# Patient Record
Sex: FEMALE | Race: WHITE | NOT HISPANIC OR LATINO | Employment: UNEMPLOYED | URBAN - METROPOLITAN AREA
[De-identification: names, ages, dates, MRNs, and addresses within clinical notes are randomized per-mention and may not be internally consistent; named-entity substitution may affect disease eponyms.]

---

## 2017-09-16 ENCOUNTER — HOSPITAL ENCOUNTER (OUTPATIENT)
Dept: SLEEP MEDICINE | Facility: HOSPITAL | Age: 47
Discharge: HOME | End: 2017-09-16
Attending: NURSE PRACTITIONER
Payer: MEDICAID

## 2017-09-16 DIAGNOSIS — G47.33 OBSTRUCTIVE SLEEP APNEA (ADULT) (PEDIATRIC): ICD-10-CM

## 2017-09-16 PROCEDURE — 95810 POLYSOM 6/> YRS 4/> PARAM: CPT

## 2017-10-14 ENCOUNTER — HOSPITAL ENCOUNTER (OUTPATIENT)
Dept: SLEEP MEDICINE | Facility: HOSPITAL | Age: 47
Discharge: HOME | End: 2017-10-14
Attending: NURSE PRACTITIONER
Payer: MEDICAID

## 2017-10-14 DIAGNOSIS — G47.33 OBSTRUCTIVE SLEEP APNEA SYNDROME: ICD-10-CM

## 2017-10-14 PROCEDURE — 95811 POLYSOM 6/>YRS CPAP 4/> PARM: CPT

## 2018-11-13 ENCOUNTER — CLINICAL SUPPORT (OUTPATIENT)
Dept: SMOKING CESSATION | Facility: CLINIC | Age: 48
End: 2018-11-13
Payer: COMMERCIAL

## 2018-11-13 DIAGNOSIS — F17.210 HEAVY CIGARETTE SMOKER (20-39 PER DAY): Primary | ICD-10-CM

## 2018-11-13 PROCEDURE — 99404 PREV MED CNSL INDIV APPRX 60: CPT | Mod: S$GLB,,,

## 2018-11-13 RX ORDER — BUPROPION HYDROCHLORIDE 150 MG/1
TABLET, EXTENDED RELEASE ORAL
Qty: 60 TABLET | Refills: 0 | Status: SHIPPED | OUTPATIENT
Start: 2018-11-13 | End: 2018-12-11 | Stop reason: SDUPTHER

## 2018-11-13 RX ORDER — VARENICLINE TARTRATE 0.5 (11)-1
KIT ORAL
Qty: 1 PACKAGE | Refills: 0 | Status: SHIPPED | OUTPATIENT
Start: 2018-11-13 | End: 2018-12-13

## 2018-11-13 NOTE — PROGRESS NOTES
Patient currently smoking 2 packs per day and will begin 1:1 cessation therapy with [facility]. Patient will begin prescribed tobacco cessation medication regimen of starter pack of Chantix, along with 150mg bupropion in AM for 3 days, then increase to 150mg bupropion BID.

## 2018-11-27 ENCOUNTER — CLINICAL SUPPORT (OUTPATIENT)
Dept: SMOKING CESSATION | Facility: CLINIC | Age: 48
End: 2018-11-27
Payer: COMMERCIAL

## 2018-11-27 DIAGNOSIS — F17.210 MODERATE SMOKER (20 OR LESS PER DAY): Primary | ICD-10-CM

## 2018-11-27 PROCEDURE — 99402 PREV MED CNSL INDIV APPRX 30: CPT | Mod: S$GLB,,,

## 2018-11-27 NOTE — Clinical Note
20 cig/day, down from 2-2.5 packs/day two weeks ago; pt is commended on progress, encouraged to stay focused and patient with continued forward progress; discussed with patient her caffeine intake and dealing with frustration; The patient remains on the prescribed tobacco cessation medication regimen of 1 mg Chantix BID, along with 150mg bupropion BID, monitoring for increased dry mouth

## 2018-11-28 NOTE — PROGRESS NOTES
Individual Follow-Up Form    11/27/2018    Quit Date:     Clinical Status of Patient: Outpatient    Length of Service: 30 minutes    Continuing Medication: yes  Chantix or Wellbutrin    Other Medications:      Target Symptoms: Withdrawal and medication side effects. The following were  rated moderate (3) to severe (4) on TCRS:  · Moderate (3): none  · Severe (4): none    Comments: 20 cig/day, down from 2-2.5 packs/day two weeks ago; pt is commended on progress, encouraged to stay focused and patient with continued forward progress; discussed with patient her caffeine intake and dealing with frustration; The patient remains on the prescribed tobacco cessation medication regimen of 1 mg Chantix BID, along with 150mg bupropion BID, monitoring for increased dry mouth       Diagnosis: F17.210    Next Visit: 2 weeks

## 2018-12-09 DIAGNOSIS — F17.210 HEAVY CIGARETTE SMOKER (20-39 PER DAY): ICD-10-CM

## 2018-12-10 RX ORDER — BUPROPION HYDROCHLORIDE 150 MG/1
TABLET, EXTENDED RELEASE ORAL
Qty: 60 TABLET | Refills: 0 | OUTPATIENT
Start: 2018-12-10

## 2018-12-10 RX ORDER — VARENICLINE TARTRATE 0.5 (11)-1
KIT ORAL
Refills: 0 | OUTPATIENT
Start: 2018-12-10

## 2018-12-11 ENCOUNTER — CLINICAL SUPPORT (OUTPATIENT)
Dept: SMOKING CESSATION | Facility: CLINIC | Age: 48
End: 2018-12-11
Payer: COMMERCIAL

## 2018-12-11 DIAGNOSIS — F17.210 MODERATE SMOKER (20 OR LESS PER DAY): Primary | ICD-10-CM

## 2018-12-11 DIAGNOSIS — F17.210 HEAVY CIGARETTE SMOKER (20-39 PER DAY): ICD-10-CM

## 2018-12-11 PROCEDURE — 99403 PREV MED CNSL INDIV APPRX 45: CPT | Mod: S$GLB,,,

## 2018-12-11 RX ORDER — BUPROPION HYDROCHLORIDE 150 MG/1
150 TABLET, EXTENDED RELEASE ORAL 2 TIMES DAILY
Qty: 60 TABLET | Refills: 0 | Status: SHIPPED | OUTPATIENT
Start: 2018-12-11 | End: 2019-01-07 | Stop reason: SDUPTHER

## 2018-12-11 RX ORDER — VARENICLINE TARTRATE 1 MG/1
1 TABLET, FILM COATED ORAL 2 TIMES DAILY
Qty: 62 TABLET | Refills: 0 | Status: SHIPPED | OUTPATIENT
Start: 2018-12-11 | End: 2019-01-07 | Stop reason: SDUPTHER

## 2018-12-11 NOTE — Clinical Note
pt is down to 10-20 cig/day from 2-2.5 packs/day; pt is doing well on combination therapy of Chantix and bupropion, except for constant ringing in ears that increased as dosage increased, pt agreed to reduce to .5mg Chantix and 75mg bupropion BID and re-evaluate symptoms in clinic next week; congratulated pt on progress and discussed with pt habit breaking, caffeine intake, exercise, and patience with self during quitting process

## 2018-12-12 NOTE — PROGRESS NOTES
Individual Follow-Up Form    12/11/2018    Quit Date:     Clinical Status of Patient: Outpatient    Length of Service: 30 minutes    Continuing Medication: yes  Chantix or Wellbutrin    Other Medications:      Target Symptoms: Withdrawal and medication side effects. The following were  rated moderate (3) to severe (4) on TCRS:  · Moderate (3): none  · Severe (4): ringing in ears;reviewed with patient    Comments: pt is down to 10-20 cig/day from 2-2.5 packs/day; pt is doing well on combination therapy of Chantix and bupropion, except for constant ringing in ears that increased as dosage increased, pt agreed to reduce to .5mg Chantix and 75mg bupropion BID and re-evaluate symptoms in clinic next week; congratulated pt on progress and discussed with pt habit breaking, caffeine intake, exercise, and patience with self during quitting process    Diagnosis: F17.210    Next Visit: 1 week

## 2018-12-20 ENCOUNTER — CLINICAL SUPPORT (OUTPATIENT)
Dept: SMOKING CESSATION | Facility: CLINIC | Age: 48
End: 2018-12-20
Payer: COMMERCIAL

## 2018-12-20 DIAGNOSIS — F17.210 MODERATE SMOKER (20 OR LESS PER DAY): Primary | ICD-10-CM

## 2018-12-20 PROCEDURE — 99402 PREV MED CNSL INDIV APPRX 30: CPT | Mod: S$GLB,,,

## 2018-12-20 NOTE — Clinical Note
18-20 cig/day; pt is responding well to combination therapy of 1mg Chantix BID, along with 150mg bupropion BID, without any negative side effects at this time, pt increased back to full dose on her own and is no longer experiencing the ringing in the ears as before; pt is working on reduction plan, reducing 1-2 cig/day every 5-7 days

## 2018-12-21 NOTE — PROGRESS NOTES
Individual Follow-Up Form    12/20/2018    Quit Date:     Clinical Status of Patient: Outpatient    Length of Service: 30 minutes    Continuing Medication: yes  Chantix or Wellbutrin    Other Medications:      Target Symptoms: Withdrawal and medication side effects. The following were  rated moderate (3) to severe (4) on TCRS:  · Moderate (3): none  · Severe (4): none    Comments: 18-20 cig/day; pt is responding well to combination therapy of 1mg Chantix BID, along with 150mg bupropion BID, without any negative side effects at this time, pt increased back to full dose on her own and is no longer experiencing the ringing in the ears as before; pt is working on reduction plan, reducing 1-2 cig/day every 5-7 days    Diagnosis: F17.210    Next Visit: 2 weeks

## 2019-01-07 ENCOUNTER — CLINICAL SUPPORT (OUTPATIENT)
Dept: SMOKING CESSATION | Facility: CLINIC | Age: 49
End: 2019-01-07
Payer: COMMERCIAL

## 2019-01-07 DIAGNOSIS — F17.210 MODERATE SMOKER (20 OR LESS PER DAY): Primary | ICD-10-CM

## 2019-01-07 DIAGNOSIS — F17.210 MODERATE SMOKER (20 OR LESS PER DAY): ICD-10-CM

## 2019-01-07 DIAGNOSIS — F17.210 HEAVY CIGARETTE SMOKER (20-39 PER DAY): ICD-10-CM

## 2019-01-07 PROCEDURE — 99402 PREV MED CNSL INDIV APPRX 30: CPT | Mod: S$GLB,,,

## 2019-01-07 PROCEDURE — 99402 PR PREVENT COUNSEL,INDIV,30 MIN: ICD-10-PCS | Mod: S$GLB,,,

## 2019-01-07 RX ORDER — VARENICLINE TARTRATE 1 MG/1
TABLET, FILM COATED ORAL
Qty: 62 TABLET | Refills: 0 | Status: CANCELLED | OUTPATIENT
Start: 2019-01-07

## 2019-01-07 RX ORDER — VARENICLINE TARTRATE 1 MG/1
1 TABLET, FILM COATED ORAL 2 TIMES DAILY
Qty: 62 TABLET | Refills: 0 | Status: SHIPPED | OUTPATIENT
Start: 2019-01-07 | End: 2019-01-29 | Stop reason: SDUPTHER

## 2019-01-07 RX ORDER — BUPROPION HYDROCHLORIDE 150 MG/1
150 TABLET, EXTENDED RELEASE ORAL 2 TIMES DAILY
Qty: 60 TABLET | Refills: 0 | Status: SHIPPED | OUTPATIENT
Start: 2019-01-07 | End: 2019-01-29 | Stop reason: SDUPTHER

## 2019-01-08 NOTE — PROGRESS NOTES
Individual Follow-Up Form    1/7/2019    Quit Date:     Clinical Status of Patient: Outpatient    Length of Service: 30 minutes    Continuing Medication: yes  Chantix or Wellbutrin    Other Medications:      Target Symptoms: Withdrawal and medication side effects. The following were  rated moderate (3) to severe (4) on TCRS:  · Moderate (3): none  · Severe (4): none    Comments: 16 cig/day; The patient remains on the prescribed tobacco cessation medication regimen of 1 mg Chantix BID, along with 150mg bupropion BID as directed, without any negative side effects at this time; pt reports still not liking the way food tastes and smells, also reports does not want cigarettes as much and does not smoke entire cigarettes; pt will begin reduction plan of decreasing 1-2 cig/day every 5-6 days       Diagnosis: F17.210    Next Visit: 3 weeks

## 2019-01-29 ENCOUNTER — CLINICAL SUPPORT (OUTPATIENT)
Dept: SMOKING CESSATION | Facility: CLINIC | Age: 49
End: 2019-01-29
Payer: COMMERCIAL

## 2019-01-29 DIAGNOSIS — F17.210 HEAVY CIGARETTE SMOKER (20-39 PER DAY): ICD-10-CM

## 2019-01-29 DIAGNOSIS — F17.210 LIGHT CIGARETTE SMOKER (1-9 CIGS/DAY): Primary | ICD-10-CM

## 2019-01-29 DIAGNOSIS — F17.210 MODERATE SMOKER (20 OR LESS PER DAY): ICD-10-CM

## 2019-01-29 PROCEDURE — 99402 PR PREVENT COUNSEL,INDIV,30 MIN: ICD-10-PCS | Mod: S$GLB,,,

## 2019-01-29 PROCEDURE — 99402 PREV MED CNSL INDIV APPRX 30: CPT | Mod: S$GLB,,,

## 2019-01-29 RX ORDER — VARENICLINE TARTRATE 1 MG/1
1 TABLET, FILM COATED ORAL 2 TIMES DAILY
Qty: 62 TABLET | Refills: 0 | Status: SHIPPED | OUTPATIENT
Start: 2019-01-29 | End: 2019-03-01

## 2019-01-29 RX ORDER — BUPROPION HYDROCHLORIDE 150 MG/1
150 TABLET, EXTENDED RELEASE ORAL 2 TIMES DAILY
Qty: 60 TABLET | Refills: 0 | Status: SHIPPED | OUTPATIENT
Start: 2019-01-29 | End: 2019-03-13 | Stop reason: SDUPTHER

## 2019-01-29 NOTE — Clinical Note
7 cig/day; pt is doing well with combination therapy and remains on the prescribed tobacco cessation medication regimen of 1 mg Chantix BID, along with 150mg bupropion BID as directed, without any negative side effects at this time; worked with pt to develop reduction plan by reducing 1 cig/day each Monday, resulting in a quit date of 3/18/19 if followed as planned, pt has option to reduce more frequently to quit earlier but advised not to reduce by more than 2 cig/day each week

## 2019-01-30 NOTE — PROGRESS NOTES
Individual Follow-Up Form    1/29/2019    Quit Date:     Clinical Status of Patient: Outpatient    Length of Service: 30 minutes    Continuing Medication: yes  Chantix or Wellbutrin    Other Medications:      Target Symptoms: Withdrawal and medication side effects. The following were  rated moderate (3) to severe (4) on TCRS:  · Moderate (3): none  · Severe (4): none    Comments: 7 cig/day; pt is doing well with combination therapy and remains on the prescribed tobacco cessation medication regimen of 1 mg Chantix BID, along with 150mg bupropion BID as directed, without any negative side effects at this time; worked with pt to develop reduction plan by reducing 1 cig/day each Monday, resulting in a quit date of 3/18/19 if followed as planned, pt has option to reduce more frequently to quit earlier but advised not to reduce by more than 2 cig/day each week       Diagnosis: F17.210    Next Visit: 2 weeks

## 2019-02-13 ENCOUNTER — CLINICAL SUPPORT (OUTPATIENT)
Dept: SMOKING CESSATION | Facility: CLINIC | Age: 49
End: 2019-02-13
Payer: COMMERCIAL

## 2019-02-13 DIAGNOSIS — F17.210 LIGHT CIGARETTE SMOKER (1-9 CIGS/DAY): Primary | ICD-10-CM

## 2019-02-13 PROCEDURE — 99406 BEHAV CHNG SMOKING 3-10 MIN: CPT | Mod: S$GLB,,,

## 2019-02-13 PROCEDURE — 99406 PR TOBACCO USE CESSATION INTERMEDIATE 3-10 MINUTES: ICD-10-PCS | Mod: S$GLB,,,

## 2019-02-14 NOTE — PROGRESS NOTES
5 cig/day; pt is doing well with reduction plan and remains focused on following plan as discussed; pt is doing well with combination therapy and remains on the prescribed tobacco cessation medication regimen of 1 mg Chantix BID, along with 150mg bupropion BID as directed, without any negative side effects at this time; pt has been working on finding things that work well her changes in taste and smell; pt in on track to achieve quit date o f 3/18/19

## 2019-03-13 ENCOUNTER — CLINICAL SUPPORT (OUTPATIENT)
Dept: SMOKING CESSATION | Facility: CLINIC | Age: 49
End: 2019-03-13
Payer: COMMERCIAL

## 2019-03-13 DIAGNOSIS — F17.210 LIGHT CIGARETTE SMOKER (1-9 CIGS/DAY): Primary | ICD-10-CM

## 2019-03-13 DIAGNOSIS — F17.210 HEAVY CIGARETTE SMOKER (20-39 PER DAY): ICD-10-CM

## 2019-03-13 PROCEDURE — 99402 PREV MED CNSL INDIV APPRX 30: CPT | Mod: S$GLB,,,

## 2019-03-13 PROCEDURE — 99402 PR PREVENT COUNSEL,INDIV,30 MIN: ICD-10-PCS | Mod: S$GLB,,,

## 2019-03-13 RX ORDER — VARENICLINE TARTRATE 1 MG/1
1 TABLET, FILM COATED ORAL 2 TIMES DAILY
Qty: 62 TABLET | Refills: 0 | Status: SHIPPED | OUTPATIENT
Start: 2019-03-13 | End: 2019-04-13

## 2019-03-13 RX ORDER — BUPROPION HYDROCHLORIDE 150 MG/1
150 TABLET, EXTENDED RELEASE ORAL 2 TIMES DAILY
Qty: 60 TABLET | Refills: 0 | Status: SHIPPED | OUTPATIENT
Start: 2019-03-13 | End: 2019-04-15 | Stop reason: SDUPTHER

## 2019-03-13 NOTE — Clinical Note
7 cig/day; pt fell ill and was unable to continue Chantix and bupropion for approx 2 weeks, pt slipped back to 7 cig/day but remains focused on quit attempt; pt has resumed 1mg Chantix BID and 150mg bupropion BID without any negative side effects and is responding well; pt has adjusted quit date by two to reflect new plan, new date is 4/1/19; commended pt on her progress and ability to remain focused during her illness

## 2019-03-13 NOTE — PROGRESS NOTES
Individual Follow-Up Form    3/13/2019    Quit Date:     Clinical Status of Patient: Outpatient    Length of Service: 30 minutes    Continuing Medication: yes  Chantix or Wellbutrin    Other Medications:      Target Symptoms: Withdrawal and medication side effects. The following were  rated moderate (3) to severe (4) on TCRS:  · Moderate (3): desire/crave tobacco; reviewed with patient  · Severe (4): none    Comments: 7 cig/day; pt fell ill and was unable to continue Chantix and bupropion for approx 2 weeks, pt slipped back to 7 cig/day but remains focused on quit attempt; pt has resumed 1mg Chantix BID and 150mg bupropion BID without any negative side effects and is responding well; pt has adjusted quit date by two to reflect new plan, new date is 4/1/19; commended pt on her progress and ability to remain focused during her illness    Diagnosis: F17.210    Next Visit: 2 weeks

## 2019-04-08 ENCOUNTER — TELEPHONE (OUTPATIENT)
Dept: SMOKING CESSATION | Facility: CLINIC | Age: 49
End: 2019-04-08

## 2019-04-13 DIAGNOSIS — F17.210 HEAVY CIGARETTE SMOKER (20-39 PER DAY): ICD-10-CM

## 2019-04-13 RX ORDER — BUPROPION HYDROCHLORIDE 150 MG/1
TABLET, EXTENDED RELEASE ORAL
Qty: 60 TABLET | Refills: 0 | OUTPATIENT
Start: 2019-04-13

## 2019-04-15 ENCOUNTER — CLINICAL SUPPORT (OUTPATIENT)
Dept: SMOKING CESSATION | Facility: CLINIC | Age: 49
End: 2019-04-15
Payer: COMMERCIAL

## 2019-04-15 DIAGNOSIS — F17.210 LIGHT CIGARETTE SMOKER (1-9 CIGS/DAY): Primary | ICD-10-CM

## 2019-04-15 DIAGNOSIS — F17.210 HEAVY CIGARETTE SMOKER (20-39 PER DAY): ICD-10-CM

## 2019-04-15 PROCEDURE — 99402 PREV MED CNSL INDIV APPRX 30: CPT | Mod: S$GLB,,,

## 2019-04-15 PROCEDURE — 99402 PR PREVENT COUNSEL,INDIV,30 MIN: ICD-10-PCS | Mod: S$GLB,,,

## 2019-04-15 RX ORDER — BUPROPION HYDROCHLORIDE 150 MG/1
150 TABLET, EXTENDED RELEASE ORAL 2 TIMES DAILY
Qty: 60 TABLET | Refills: 0 | Status: SHIPPED | OUTPATIENT
Start: 2019-04-15 | End: 2019-04-29 | Stop reason: ALTCHOICE

## 2019-04-15 RX ORDER — VARENICLINE TARTRATE 1 MG/1
1 TABLET, FILM COATED ORAL 2 TIMES DAILY
Qty: 60 TABLET | Refills: 0 | Status: SHIPPED | OUTPATIENT
Start: 2019-04-15 | End: 2019-04-29 | Stop reason: SDUPTHER

## 2019-04-15 RX ORDER — DM/P-EPHED/ACETAMINOPH/DOXYLAM 30-7.5/3
LIQUID (ML) ORAL
Qty: 108 LOZENGE | Refills: 0 | Status: SHIPPED | OUTPATIENT
Start: 2019-04-15

## 2019-04-15 NOTE — PROGRESS NOTES
Individual Follow-Up Form    4/15/2019    Quit Date:     Clinical Status of Patient: Outpatient    Length of Service: 30 minutes    Continuing Medication: yes  Chantix, Wellbutrin or Nicotine Lozenges    Other Medications:      Target Symptoms: Withdrawal and medication side effects. The following were  rated moderate (3) to severe (4) on TCRS:  · Moderate (3): none  · Severe (4): none    Comments: 5 cig/day; pt is doing well on combination therapy and remains on the prescribed tobacco cessation medication regimen of 1 mg Chantix BID,along with 150mg bupropion BID, adding 2mg nicotine lozenge to replace remaining cigarettes, without any negative side effects at this time; discussed with pt habit breaking, changes in taste/smell, and resuming forward progress in quit attempt; pt set goals to get rid of coffee pot and to focus on replacing Dr. Pepper with another beverage to break association with these items and smoking      Diagnosis: F17.210    Next Visit: 2 weeks

## 2019-04-15 NOTE — Clinical Note
5 cig/day; pt is doing well on combination therapy and remains on the prescribed tobacco cessation medication regimen of 1 mg Chantix BID,along with 150mg bupropion BID, adding 2mg nicotine lozenge to replace remaining cigarettes, without any negative side effects at this time; discussed with pt habit breaking, changes in taste/smell, and resuming forward progress in quit attempt; pt set goals to get rid of coffee pot and to focus on replacing Dr. Pepper with another beverage to break association with these items and smoking

## 2019-04-29 ENCOUNTER — CLINICAL SUPPORT (OUTPATIENT)
Dept: SMOKING CESSATION | Facility: CLINIC | Age: 49
End: 2019-04-29
Payer: COMMERCIAL

## 2019-04-29 DIAGNOSIS — F17.210 LIGHT CIGARETTE SMOKER (1-9 CIGS/DAY): Primary | ICD-10-CM

## 2019-04-29 PROCEDURE — 99402 PREV MED CNSL INDIV APPRX 30: CPT | Mod: S$GLB,,,

## 2019-04-29 PROCEDURE — 99402 PR PREVENT COUNSEL,INDIV,30 MIN: ICD-10-PCS | Mod: S$GLB,,,

## 2019-04-29 RX ORDER — ESCITALOPRAM OXALATE 20 MG/1
20 TABLET ORAL DAILY
COMMUNITY

## 2019-04-29 RX ORDER — VARENICLINE TARTRATE 1 MG/1
1 TABLET, FILM COATED ORAL 2 TIMES DAILY
Qty: 60 TABLET | Refills: 0 | Status: SHIPPED | OUTPATIENT
Start: 2019-04-29 | End: 2019-05-29

## 2019-04-29 NOTE — Clinical Note
4 cig/day; pt was taken off bupropion by P to replace with another depression medication, pt remains on 1mg Chantix BID, along with 2mg nicotine lozenges as needed; discussed with patient habit breaking behaviors and pt is encouraged to increase lozenge use and to move comfortable glider rocker to front porch to inhibit habitual use; pt reports only taking a couple puffs at a time and realized she does not want the cigarette

## 2019-04-29 NOTE — PROGRESS NOTES
Individual Follow-Up Form    4/29/2019    Quit Date:     Clinical Status of Patient: Outpatient    Length of Service: 30 minutes    Continuing Medication: yes  Chantix or Nicotine Lozenges    Other Medications:      Target Symptoms: Withdrawal and medication side effects. The following were  rated moderate (3) to severe (4) on TCRS:  · Moderate (3): none  · Severe (4): none    Comments: 4 cig/day; pt was taken off bupropion by [facility] to replace with another depression medication, pt remains on 1mg Chantix BID, along with 2mg nicotine lozenges as needed; discussed with patient habit breaking behaviors and pt is encouraged to increase lozenge use and to move comfortable glider rocker to front porch to inhibit habitual use; pt reports only taking a couple puffs at a time and realized she does not want the cigarette    Diagnosis: F17.210    Next Visit: 2 weeks

## 2019-05-13 ENCOUNTER — CLINICAL SUPPORT (OUTPATIENT)
Dept: SMOKING CESSATION | Facility: CLINIC | Age: 49
End: 2019-05-13
Payer: COMMERCIAL

## 2019-05-13 DIAGNOSIS — F17.210 LIGHT CIGARETTE SMOKER (1-9 CIGS/DAY): Primary | ICD-10-CM

## 2019-05-13 PROCEDURE — 99407 BEHAV CHNG SMOKING > 10 MIN: CPT | Mod: S$GLB,,,

## 2019-05-13 PROCEDURE — 99407 PR TOBACCO USE CESSATION INTENSIVE >10 MINUTES: ICD-10-PCS | Mod: S$GLB,,,

## 2019-05-13 NOTE — PROGRESS NOTES
Individual Follow-Up Form    5/13/2019    Quit Date:     Clinical Status of Patient: Outpatient    Length of Service: 30 minutes    Continuing Medication: yes  Chantix or Nicotine Lozenges    Other Medications:      Target Symptoms: Withdrawal and medication side effects. The following were  rated moderate (3) to severe (4) on TCRS:  · Moderate (3): none  · Severe (4): none    Comments: 4 cig/day; pt remains on 1mg Chantix BID, along with 2mg nicotine lozenges as needed; discussed with patient who is in control (pt vs cig), rewarding progress, and set goal to complete dry run    Diagnosis: F17.200    Next Visit: 2 weeks

## 2019-05-13 NOTE — Clinical Note
4 cig/day; pt remains on 1mg Chantix BID, along with 2mg nicotine lozenges as needed; discussed with patient who is in control (pt vs cig), rewarding progress, and set goal to complete dry run

## 2019-05-30 ENCOUNTER — TELEPHONE (OUTPATIENT)
Dept: SMOKING CESSATION | Facility: CLINIC | Age: 49
End: 2019-05-30

## 2019-06-18 ENCOUNTER — TELEPHONE (OUTPATIENT)
Dept: SMOKING CESSATION | Facility: CLINIC | Age: 49
End: 2019-06-18

## 2019-06-18 DIAGNOSIS — F17.200 NICOTINE DEPENDENCE: Primary | ICD-10-CM

## 2019-06-18 RX ORDER — VARENICLINE TARTRATE 1 MG/1
1 TABLET, FILM COATED ORAL 2 TIMES DAILY
Qty: 60 TABLET | Refills: 0 | Status: SHIPPED | OUTPATIENT
Start: 2019-06-18 | End: 2019-07-18

## 2019-07-10 ENCOUNTER — CLINICAL SUPPORT (OUTPATIENT)
Dept: SMOKING CESSATION | Facility: CLINIC | Age: 49
End: 2019-07-10
Payer: COMMERCIAL

## 2019-07-10 DIAGNOSIS — F17.200 NICOTINE DEPENDENCE: Primary | ICD-10-CM

## 2019-07-10 PROCEDURE — 99407 BEHAV CHNG SMOKING > 10 MIN: CPT | Mod: S$GLB,,,

## 2019-07-10 PROCEDURE — 99407 PR TOBACCO USE CESSATION INTENSIVE >10 MINUTES: ICD-10-PCS | Mod: S$GLB,,,

## 2019-07-10 NOTE — Clinical Note
0 cig/day since 6/16/19; pt has discontinued cessation medication on her own on 6/22 and is not interested in resuming at this time; SCT end date 7/11/19, next avail 11/12/19; pt is discharged from cessation clinic, encouraged to contact clinic if cravings/desire increase

## 2019-07-10 NOTE — PROGRESS NOTES
Individual Follow-Up Form    7/10/2019    Quit Date: 6/16/19    Clinical Status of Patient: Outpatient    Length of Service: 30 minutes    Continuing Medication: no    Other Medications:      Target Symptoms: Withdrawal and medication side effects. The following were  rated moderate (3) to severe (4) on TCRS:  · Moderate (3): none  · Severe (4): none    Comments: 0 cig/day since 6/16/19; pt has discontinued cessation medication on her own on 6/22 and is not interested in resuming at this time; SCT end date 7/11/19, next avail 11/12/19; pt is discharged from cessation clinic, encouraged to contact clinic if cravings/desire increase    Diagnosis: F17.200    Next Visit: discharged from clinic

## 2019-12-02 ENCOUNTER — TELEPHONE (OUTPATIENT)
Dept: SMOKING CESSATION | Facility: CLINIC | Age: 49
End: 2019-12-02

## 2020-08-07 ENCOUNTER — TELEPHONE (OUTPATIENT)
Dept: PHARMACY | Facility: CLINIC | Age: 50
End: 2020-08-07

## 2020-08-07 NOTE — TELEPHONE ENCOUNTER
Krysten Saleh M Staff 1 hour ago (3:37 PM)     Good Afternoon,     I'm working on a PA for the patient's [employer]. Insurance wants her to try [employer] or [employer]. Can it be switched to one of the preferred?